# Patient Record
Sex: MALE | Employment: FULL TIME | URBAN - METROPOLITAN AREA
[De-identification: names, ages, dates, MRNs, and addresses within clinical notes are randomized per-mention and may not be internally consistent; named-entity substitution may affect disease eponyms.]

---

## 2025-06-05 ENCOUNTER — OFFICE VISIT (OUTPATIENT)
Dept: PRIMARY CARE | Facility: CLINIC | Age: 60
End: 2025-06-05
Payer: COMMERCIAL

## 2025-06-05 VITALS
RESPIRATION RATE: 16 BRPM | BODY MASS INDEX: 28.22 KG/M2 | SYSTOLIC BLOOD PRESSURE: 124 MMHG | WEIGHT: 186.2 LBS | HEIGHT: 68 IN | HEART RATE: 73 BPM | DIASTOLIC BLOOD PRESSURE: 85 MMHG | OXYGEN SATURATION: 96 %

## 2025-06-05 DIAGNOSIS — I21.11 STEMI INVOLVING RIGHT CORONARY ARTERY (MULTI): ICD-10-CM

## 2025-06-05 DIAGNOSIS — E78.5 DYSLIPIDEMIA, GOAL LDL BELOW 70: ICD-10-CM

## 2025-06-05 DIAGNOSIS — H53.8 BLURRY VISION: Primary | ICD-10-CM

## 2025-06-05 DIAGNOSIS — R06.09 DYSPNEA ON EXERTION: ICD-10-CM

## 2025-06-05 DIAGNOSIS — R07.9 CHEST PAIN, UNSPECIFIED TYPE: ICD-10-CM

## 2025-06-05 DIAGNOSIS — Z13.6 SCREENING FOR CARDIOVASCULAR CONDITION: ICD-10-CM

## 2025-06-05 PROBLEM — K21.9 GASTROESOPHAGEAL REFLUX DISEASE WITHOUT ESOPHAGITIS: Status: ACTIVE | Noted: 2021-07-07

## 2025-06-05 LAB — POC HEMOGLOBIN A1C: 5.4 % (ref 4.2–6.5)

## 2025-06-05 PROCEDURE — 1036F TOBACCO NON-USER: CPT | Performed by: NURSE PRACTITIONER

## 2025-06-05 PROCEDURE — 83036 HEMOGLOBIN GLYCOSYLATED A1C: CPT | Performed by: NURSE PRACTITIONER

## 2025-06-05 PROCEDURE — 99204 OFFICE O/P NEW MOD 45 MIN: CPT | Performed by: NURSE PRACTITIONER

## 2025-06-05 PROCEDURE — 93000 ELECTROCARDIOGRAM COMPLETE: CPT | Performed by: NURSE PRACTITIONER

## 2025-06-05 PROCEDURE — 3008F BODY MASS INDEX DOCD: CPT | Performed by: NURSE PRACTITIONER

## 2025-06-05 RX ORDER — ESCITALOPRAM OXALATE 10 MG/1
1 TABLET ORAL DAILY
COMMUNITY
End: 2025-06-05 | Stop reason: WASHOUT

## 2025-06-05 RX ORDER — ROSUVASTATIN CALCIUM 20 MG/1
20 TABLET, COATED ORAL
COMMUNITY
Start: 2023-11-27 | End: 2025-06-05 | Stop reason: SDUPTHER

## 2025-06-05 RX ORDER — ASPIRIN 81 MG/1
81 TABLET ORAL DAILY
COMMUNITY

## 2025-06-05 RX ORDER — ASPIRIN 81 MG/1
81 TABLET ORAL DAILY
COMMUNITY
End: 2025-06-05 | Stop reason: ENTERED-IN-ERROR

## 2025-06-05 RX ORDER — ROSUVASTATIN CALCIUM 20 MG/1
20 TABLET, COATED ORAL
Qty: 100 TABLET | Refills: 3 | Status: SHIPPED | OUTPATIENT
Start: 2025-06-05

## 2025-06-05 RX ORDER — CETIRIZINE HYDROCHLORIDE 10 MG/1
10 TABLET ORAL DAILY
COMMUNITY

## 2025-06-05 ASSESSMENT — ENCOUNTER SYMPTOMS
LOSS OF SENSATION IN FEET: 0
OCCASIONAL FEELINGS OF UNSTEADINESS: 0
DEPRESSION: 0

## 2025-06-05 ASSESSMENT — PATIENT HEALTH QUESTIONNAIRE - PHQ9
2. FEELING DOWN, DEPRESSED OR HOPELESS: NOT AT ALL
1. LITTLE INTEREST OR PLEASURE IN DOING THINGS: NOT AT ALL
SUM OF ALL RESPONSES TO PHQ9 QUESTIONS 1 AND 2: 0

## 2025-06-05 ASSESSMENT — COLUMBIA-SUICIDE SEVERITY RATING SCALE - C-SSRS
1. IN THE PAST MONTH, HAVE YOU WISHED YOU WERE DEAD OR WISHED YOU COULD GO TO SLEEP AND NOT WAKE UP?: NO
6. HAVE YOU EVER DONE ANYTHING, STARTED TO DO ANYTHING, OR PREPARED TO DO ANYTHING TO END YOUR LIFE?: NO
2. HAVE YOU ACTUALLY HAD ANY THOUGHTS OF KILLING YOURSELF?: NO

## 2025-06-05 NOTE — PROGRESS NOTES
"Subjective   Patient ID: Miquel Alatorre is a 60 y.o. male who presents for New Patient Visit (Had heart attack 4 years ago- cardiology referral- hasn't been feeling well ).    HPI     Past medical history: CAD s/p PCI  Current meds: ASA 81 mg daily, Zyrtec 10 mg daily, an OTC antacid  Past surgical history: L carotid tumor removal  Family medical history:   -Mother: living, enlarged heart, valve replacement, macular degeneration  -Father: , lung CA  -Maternal grandmother: , macular degeneration  -Maternal grandfather: , emphysema  -Paternal grandmother: , unknown  -Paternal grandfather: , unknown  -Brother: living, none  -Brother: living, none  -Daughter: living, asthma  -Daughter: living, none  -Son: living, asthma  -Son: living, none  Social history:   -Work: real estate  -Marital status:   -Children: 4  -Alcohol: none  -Tobacco: none  -Marijuana: none  -Illicit drugs: none    He reports over the last 6 months he has been noticing some concerning symptoms. Reports some shortness of breath with exertion, usually just steps.     Does also occasionally gets chest pain or heaviness. This occurs at least a few times per week. Gets associated palpitations, brain fog, and dizziness. Lasts a couple hours. Usually will just take some baby ASA.     Has also noticed some frequent blurry vision.     Was previously following with cardiology for previous CAD s/p PCI. Was on plavix but was stopped by cardiology after 18-24 months. States he was also previously on a cholesterol medication but states this was stopped, thinks cardiology stopped it but doesn't remember.     Review of Systems  ROS negative except as noted above in HPI.     Objective   /85 (BP Location: Left arm, Patient Position: Sitting, BP Cuff Size: Large adult)   Pulse 73   Resp 16   Ht 1.727 m (5' 7.99\")   Wt 84.5 kg (186 lb 3.2 oz)   SpO2 96%   BMI 28.32 kg/m²     Physical Exam  General: Alert and " oriented, in no acute distress. Appears stated age, well-nourished, and well hydrated  HEENT:  - Head: Normocephalic and atraumatic   - Eyes: EOMI, PERRLA  - ENT: Hearing grossly intact  Heart: RRR. No murmurs, clicks, or rubs  Lungs: Unlabored breathing. CTAB with no crackles, wheezes, or rhonchi  Abdomen: Normal BS in all 4 quadrants. Soft, non-tender, non-distended, with no masses  Extremities: Warm and well perfused. No edema. Normal peripheral pulses  Musculoskeletal: Normal gait and station  Neurological: Alert and oriented. No gross neurological deficits  Psychological: Appropriate mood and affect  Skin: No rash, abnormal lesions, cyanosis, or erythema    Assessment/Plan   Diagnoses and all orders for this visit:  Blurry vision  -     POCT glycosylated hemoglobin (Hb A1C) manually resulted: 5.4%  -     Recommend following up with eye doctor  Chest pain, unspecified type  Dyspnea on exertion  STEMI involving right coronary artery (Multi)  Dyslipidemia, goal LDL below 70  -     ECG 12 lead (Clinic Performed): NSR with no signs of acute ischemia   -     Echocardiogram stress test; Future  -     START rosuvastatin (Crestor) 20 mg tablet; Take 1 tablet (20 mg) by mouth once daily.  -     Lipid Panel; Future  -     Referral to Cardiology; Future  Screening for cardiovascular condition  -     Comprehensive metabolic panel; Future  -     Tsh With Reflex To Free T4 If Abnormal; Future  -     CBC and Auto Differential; Future    FU in the next couple weeks for physical and BP check; this is borderline in office today    NOLAN Guy-CNP  [facility]

## 2025-06-09 LAB
ALBUMIN SERPL-MCNC: 4.3 G/DL (ref 3.6–5.1)
ALP SERPL-CCNC: 84 U/L (ref 35–144)
ALT SERPL-CCNC: 13 U/L (ref 9–46)
ANION GAP SERPL CALCULATED.4IONS-SCNC: 8 MMOL/L (CALC) (ref 7–17)
AST SERPL-CCNC: 16 U/L (ref 10–35)
BASOPHILS # BLD AUTO: 70 CELLS/UL (ref 0–200)
BASOPHILS NFR BLD AUTO: 1.1 %
BILIRUB SERPL-MCNC: 0.9 MG/DL (ref 0.2–1.2)
BUN SERPL-MCNC: 11 MG/DL (ref 7–25)
CALCIUM SERPL-MCNC: 9.2 MG/DL (ref 8.6–10.3)
CHLORIDE SERPL-SCNC: 106 MMOL/L (ref 98–110)
CHOLEST SERPL-MCNC: 151 MG/DL
CHOLEST/HDLC SERPL: 3.1 (CALC)
CO2 SERPL-SCNC: 27 MMOL/L (ref 20–32)
CREAT SERPL-MCNC: 1.18 MG/DL (ref 0.7–1.3)
EGFRCR SERPLBLD CKD-EPI 2021: 72 ML/MIN/1.73M2
EOSINOPHIL # BLD AUTO: 410 CELLS/UL (ref 15–500)
EOSINOPHIL NFR BLD AUTO: 6.4 %
ERYTHROCYTE [DISTWIDTH] IN BLOOD BY AUTOMATED COUNT: 12.4 % (ref 11–15)
GLUCOSE SERPL-MCNC: 90 MG/DL (ref 65–99)
HCT VFR BLD AUTO: 45.8 % (ref 38.5–50)
HDLC SERPL-MCNC: 48 MG/DL
HGB BLD-MCNC: 15.3 G/DL (ref 13.2–17.1)
LDLC SERPL CALC-MCNC: 87 MG/DL (CALC)
LYMPHOCYTES # BLD AUTO: 1850 CELLS/UL (ref 850–3900)
LYMPHOCYTES NFR BLD AUTO: 28.9 %
MCH RBC QN AUTO: 31.7 PG (ref 27–33)
MCHC RBC AUTO-ENTMCNC: 33.4 G/DL (ref 32–36)
MCV RBC AUTO: 95 FL (ref 80–100)
MONOCYTES # BLD AUTO: 653 CELLS/UL (ref 200–950)
MONOCYTES NFR BLD AUTO: 10.2 %
NEUTROPHILS # BLD AUTO: 3418 CELLS/UL (ref 1500–7800)
NEUTROPHILS NFR BLD AUTO: 53.4 %
NONHDLC SERPL-MCNC: 103 MG/DL (CALC)
PLATELET # BLD AUTO: 287 THOUSAND/UL (ref 140–400)
PMV BLD REES-ECKER: 9.2 FL (ref 7.5–12.5)
POTASSIUM SERPL-SCNC: 4.2 MMOL/L (ref 3.5–5.3)
PROT SERPL-MCNC: 6.1 G/DL (ref 6.1–8.1)
RBC # BLD AUTO: 4.82 MILLION/UL (ref 4.2–5.8)
SODIUM SERPL-SCNC: 141 MMOL/L (ref 135–146)
TRIGL SERPL-MCNC: 74 MG/DL
TSH SERPL-ACNC: 0.85 MIU/L (ref 0.4–4.5)
WBC # BLD AUTO: 6.4 THOUSAND/UL (ref 3.8–10.8)

## 2025-06-16 ENCOUNTER — APPOINTMENT (OUTPATIENT)
Dept: PRIMARY CARE | Facility: CLINIC | Age: 60
End: 2025-06-16
Payer: COMMERCIAL

## 2025-06-16 VITALS
OXYGEN SATURATION: 98 % | HEIGHT: 68 IN | HEART RATE: 70 BPM | RESPIRATION RATE: 17 BRPM | WEIGHT: 186 LBS | BODY MASS INDEX: 28.19 KG/M2 | DIASTOLIC BLOOD PRESSURE: 86 MMHG | SYSTOLIC BLOOD PRESSURE: 122 MMHG

## 2025-06-16 DIAGNOSIS — Z23 NEED FOR SHINGLES VACCINE: ICD-10-CM

## 2025-06-16 DIAGNOSIS — Z00.00 ANNUAL PHYSICAL EXAM: Primary | ICD-10-CM

## 2025-06-16 DIAGNOSIS — Z12.83 SKIN CANCER SCREENING: ICD-10-CM

## 2025-06-16 DIAGNOSIS — Z12.5 PROSTATE CANCER SCREENING: ICD-10-CM

## 2025-06-16 DIAGNOSIS — Z23 NEED FOR TDAP VACCINATION: ICD-10-CM

## 2025-06-16 PROCEDURE — 1036F TOBACCO NON-USER: CPT | Performed by: NURSE PRACTITIONER

## 2025-06-16 PROCEDURE — 3008F BODY MASS INDEX DOCD: CPT | Performed by: NURSE PRACTITIONER

## 2025-06-16 PROCEDURE — 90715 TDAP VACCINE 7 YRS/> IM: CPT | Performed by: NURSE PRACTITIONER

## 2025-06-16 PROCEDURE — 99396 PREV VISIT EST AGE 40-64: CPT | Performed by: NURSE PRACTITIONER

## 2025-06-16 PROCEDURE — 90472 IMMUNIZATION ADMIN EACH ADD: CPT | Performed by: NURSE PRACTITIONER

## 2025-06-16 PROCEDURE — 90471 IMMUNIZATION ADMIN: CPT | Performed by: NURSE PRACTITIONER

## 2025-06-16 PROCEDURE — 90750 HZV VACC RECOMBINANT IM: CPT | Performed by: NURSE PRACTITIONER

## 2025-06-16 ASSESSMENT — PROMIS GLOBAL HEALTH SCALE
RATE_GENERAL_HEALTH: GOOD
RATE_SOCIAL_SATISFACTION: GOOD
RATE_AVERAGE_PAIN: 3
RATE_QUALITY_OF_LIFE: GOOD
CARRYOUT_PHYSICAL_ACTIVITIES: MOSTLY
RATE_PHYSICAL_HEALTH: GOOD
EMOTIONAL_PROBLEMS: SOMETIMES
RATE_MENTAL_HEALTH: GOOD
RATE_AVERAGE_FATIGUE: MODERATE
CARRYOUT_SOCIAL_ACTIVITIES: GOOD

## 2025-06-16 ASSESSMENT — PATIENT HEALTH QUESTIONNAIRE - PHQ9
SUM OF ALL RESPONSES TO PHQ9 QUESTIONS 1 AND 2: 0
1. LITTLE INTEREST OR PLEASURE IN DOING THINGS: NOT AT ALL
2. FEELING DOWN, DEPRESSED OR HOPELESS: NOT AT ALL

## 2025-06-16 ASSESSMENT — COLUMBIA-SUICIDE SEVERITY RATING SCALE - C-SSRS
2. HAVE YOU ACTUALLY HAD ANY THOUGHTS OF KILLING YOURSELF?: NO
1. IN THE PAST MONTH, HAVE YOU WISHED YOU WERE DEAD OR WISHED YOU COULD GO TO SLEEP AND NOT WAKE UP?: NO
6. HAVE YOU EVER DONE ANYTHING, STARTED TO DO ANYTHING, OR PREPARED TO DO ANYTHING TO END YOUR LIFE?: NO

## 2025-06-16 ASSESSMENT — ENCOUNTER SYMPTOMS
DEPRESSION: 0
LOSS OF SENSATION IN FEET: 0
OCCASIONAL FEELINGS OF UNSTEADINESS: 0

## 2025-06-16 NOTE — PROGRESS NOTES
Miquel Alatorre is a 60 y.o. male who is presenting for annual physical exam.     He presents today with no concerns.     Last  Visit: unknown  Reported Health: Good    Dental, Vision, Hearing:  Regular dental visits: no  - Brushes teeth 2 times per day  Vision problems: yes  - Wears glasses or contacts? yes, glasses  - Last eye exam: couple years ago  Hearing loss: no    Immunization status:  Up to date: no    Lifestyle:  Healthy diet: yes  Regular exercise: yes  - Exercise frequency: five days a week  - Type of exercise: walking   Alcohol: no  Tobacco: no  Drugs: no    Reproductive Health:  Sexually active: yes  Contraception: no  Erectile dysfunction: no    Colorectal Cancer Screening:  Last colonoscopy or Cologuard: never done, refused at this time    Prostate Cancer Screening:  Last PSA: never done, ordered today   Metabolic Screening:  Lipid profile: 6/9/2025  Glucose screen: 6/9/2025    Review of Systems  Gen: denies fever, chills, weight loss, fatigue  HEENT: denies sinus pressure, sinus congestion, runny nose, red eyes, itchy eyes, vision loss, ear pain, hearing loss, throat pain, trouble swallowing, reports intermittent blurry vision   Neck: denies neck pain, neck swelling or masses  CV: denies chest pain, palpitations, fast heart rate, syncope  Resp: denies shortness of breath, cough, wheezing  GI: denies abdominal pain, nausea, diarrhea, constipation, hematochezia, melena  : denies dysuria, hematuria, penile discharge, frequency  Endo: denies polydipsia, polyuria, heat/cold intolerance, weight change, hair thinning  Heme: denies easy bruising, easy bleeding  Neuro: denies headache, numbness, tingling, memory loss, changes in vision  MSK: denies joint pain, joint swelling, weakness  Psych: denies suicidal ideation, homicidal ideation, depression, anxiety, mood swings  Skin: denies rashes, abnormal lesions, itching, reports mole on face that he is concerned about      Previous History  Medical  "History[1]  Surgical History[2]  Social History[3]  Family History[4]  Allergies[5]  Current Outpatient Medications   Medication Instructions    aspirin 81 mg, Daily    cetirizine (ZYRTEC) 10 mg, Daily    rosuvastatin (CRESTOR) 20 mg, oral, Daily RT       There is no immunization history for the selected administration types on file for this patient.    Visit Vitals  /86 (BP Location: Left arm, Patient Position: Sitting, BP Cuff Size: Adult)   Pulse 70   Resp 17   Ht 1.727 m (5' 7.99\")   Wt 84.4 kg (186 lb)   SpO2 98%   BMI 28.29 kg/m²   Smoking Status Never   BSA 2.01 m²        Physical Exam:  General: Alert and oriented, in no acute distress. Appears stated age, well-nourished, and well hydrated  HEENT:  - Head: Normocephalic and atraumatic   - Eyes: EOMI, PERRLA  - ENT: Hearing grossly intact. Mucus membranes pink and moist without lesions. Tonsils present without swelling or exudates. Good dentition. TMs gray  Neck: Supple. No stiffness. No thyromegaly or thyroid nodules  Heart: RRR. No murmurs, clicks, or rubs  Lungs: Unlabored breathing. CTAB with no crackles, wheezes, or rhonchi  Abdomen: Normal BS in all 4 quadrants. Soft, non-tender, non-distended, with no masses  Extremities: Warm and well perfused. No edema. Normal peripheral pulses  Musculoskeletal: ROM intact. Strength 5/5 in BUE and BLE. No joint swelling. Normal gait and station  Neurological: Alert and oriented. No gross neurological deficits. Normal sensation. No weakness. DTRs +2/4   Psychological: Appropriate mood and affect  Skin: No rash, abnormal lesions, cyanosis, or erythema      Assessment and Plan     Miquel was seen today for annual exam.  Diagnoses and all orders for this visit:  Annual physical exam (Primary)  - patient refused colon cancer screening today   - reports intermittent blurry vision, prior workup negative, will follow up with ophthalmologist  Prostate cancer screening  - Prostate Spec.Ag,Screen; Future  Need for Tdap " vaccination  - Tdap vaccine, age 7 years and older  (BOOSTRIX)  Need for shingles vaccine  - Zoster vaccine, recombinant, adult (SHINGRIX)  Skin cancer screening  - Referral to Dermatology      Patient will RTC for nurse visit for second shingles vaccine and first pneumonia vaccine. He will RTC in one year for physical.    Bertha Rangel RN   [facility] FNP Student             [1]   Past Medical History:  Diagnosis Date    ADHD (attention deficit hyperactivity disorder)     Anxiety     Headache     Heart attack 07/2020    Heart disease    [2]   Past Surgical History:  Procedure Laterality Date    CARDIAC SURGERY  2021   [3]   Social History  Tobacco Use    Smoking status: Never    Smokeless tobacco: Never   Vaping Use    Vaping status: Never Used   Substance Use Topics    Alcohol use: Never    Drug use: Never   [4]   Family History  Problem Relation Name Age of Onset    Macular degeneration Mother      Valvular heart disease Mother      Lung cancer Father      No Known Problems Brother      No Known Problems Brother      Asthma Daughter Iz     No Known Problems Daughter      Asthma Son M     No Known Problems Son      Macular degeneration Mother's Brother      Macular degeneration Maternal Grandmother      Emphysema Maternal Grandfather     [5] No Known Allergies

## 2025-06-16 NOTE — PROGRESS NOTES
I was present with the APRN student who participated in the documentation of this note. I have personally seen and re-examined the patient and performed the medical decision-making components (assessment and plan of care). I have reviewed the APRN student documentation and verified the findings in the note as written with additions or exceptions as stated in the body of this note.    Marietta Smith, APRN-CNP

## 2025-06-18 ENCOUNTER — HOSPITAL ENCOUNTER (OUTPATIENT)
Dept: CARDIOLOGY | Facility: CLINIC | Age: 60
Discharge: HOME | End: 2025-06-18
Payer: COMMERCIAL

## 2025-06-18 DIAGNOSIS — R07.9 CHEST PAIN, UNSPECIFIED TYPE: ICD-10-CM

## 2025-06-18 DIAGNOSIS — R06.09 DYSPNEA ON EXERTION: ICD-10-CM

## 2025-06-18 PROCEDURE — 93350 STRESS TTE ONLY: CPT

## 2025-06-18 PROCEDURE — 93350 STRESS TTE ONLY: CPT | Performed by: INTERNAL MEDICINE

## 2025-06-18 PROCEDURE — 93016 CV STRESS TEST SUPVJ ONLY: CPT | Performed by: INTERNAL MEDICINE

## 2025-06-18 PROCEDURE — 93018 CV STRESS TEST I&R ONLY: CPT | Performed by: INTERNAL MEDICINE

## 2025-07-10 ENCOUNTER — OFFICE VISIT (OUTPATIENT)
Dept: CARDIOLOGY | Facility: CLINIC | Age: 60
End: 2025-07-10
Payer: COMMERCIAL

## 2025-07-10 VITALS
OXYGEN SATURATION: 97 % | DIASTOLIC BLOOD PRESSURE: 86 MMHG | WEIGHT: 193 LBS | SYSTOLIC BLOOD PRESSURE: 114 MMHG | HEIGHT: 68 IN | HEART RATE: 75 BPM | BODY MASS INDEX: 29.25 KG/M2

## 2025-07-10 DIAGNOSIS — R07.9 EXERTIONAL CHEST PAIN: ICD-10-CM

## 2025-07-10 DIAGNOSIS — I21.11 STEMI INVOLVING RIGHT CORONARY ARTERY (MULTI): ICD-10-CM

## 2025-07-10 DIAGNOSIS — I25.10 ASHD (ARTERIOSCLEROTIC HEART DISEASE): Primary | ICD-10-CM

## 2025-07-10 DIAGNOSIS — E78.5 DYSLIPIDEMIA, GOAL LDL BELOW 70: ICD-10-CM

## 2025-07-10 DIAGNOSIS — R06.09 EXERTIONAL DYSPNEA: ICD-10-CM

## 2025-07-10 PROCEDURE — 3008F BODY MASS INDEX DOCD: CPT | Performed by: STUDENT IN AN ORGANIZED HEALTH CARE EDUCATION/TRAINING PROGRAM

## 2025-07-10 PROCEDURE — 99204 OFFICE O/P NEW MOD 45 MIN: CPT | Performed by: STUDENT IN AN ORGANIZED HEALTH CARE EDUCATION/TRAINING PROGRAM

## 2025-07-10 PROCEDURE — 99212 OFFICE O/P EST SF 10 MIN: CPT

## 2025-07-10 RX ORDER — ROSUVASTATIN CALCIUM 40 MG/1
40 TABLET, COATED ORAL
Qty: 90 TABLET | Refills: 1 | Status: SHIPPED | OUTPATIENT
Start: 2025-07-10 | End: 2026-01-06

## 2025-07-10 RX ORDER — AMLODIPINE BESYLATE 2.5 MG/1
2.5 TABLET ORAL DAILY
Qty: 90 TABLET | Refills: 1 | Status: SHIPPED | OUTPATIENT
Start: 2025-07-10 | End: 2026-01-06

## 2025-07-10 ASSESSMENT — ENCOUNTER SYMPTOMS
DEPRESSION: 0
OCCASIONAL FEELINGS OF UNSTEADINESS: 0
LOSS OF SENSATION IN FEET: 0

## 2025-07-10 NOTE — PATIENT INSTRUCTIONS
Thank you for your visit today. Please contact our office (via MyChart or phone) with any additional questions.     [facility]    Yolanda, RN/Clinic Nurse for:    Dr. Rashaad Celeste    [address] [facility]  [place] [address]    Phone: [phone] Press Option 5 then Option 3 to speak with the Clinic Nurse (Yolanda)    _____    To Reach:    Billing Questions -    [phone]  Scheduling / Rescheduling -  Option 1  Refills / Medication Requests -  Option 3  General Office /  -  Option 4  Results -     Option 6  Medical Records -    Option 7  Repeat Options -    Option 9

## 2025-07-10 NOTE — PROGRESS NOTES
Cardiology New Patient History and Physical    Reason for referral: Exertional chest pain, dyspnea; hx of CAD    HPI: Miquel Alatorre is a 60 y.o.  male who presents today for exertional chest pain. Past medical history of inferior STEMI (7/7/2021- CRISTOFER to RCA 2.75 x 22 mm [employer] CRISTOFER), dyslipidemia .      Past Medical History:   He has a past medical history of ADHD (attention deficit hyperactivity disorder), Anxiety, Headache, Heart attack (07/2020), and Heart disease.    Surgical History:   He has a past surgical history that includes Cardiac surgery (2021).    Family History:   Family History[1]    Allergies:  Patient has no known allergies.     Social History:   - Non-smoker  - Employed: real estate     Prior Cardiovascular Testing (personally reviewed):     Exercise stress echo (6/19/2025)  1. The resting ejection fraction was estimated at 50 to 55% with a peak exercise ejection fraction estimated at 65 to 70%.   2. Negative stress EKG for ischemia.   3. Average functional capacity for age.   4. Exercise associated chest discomfort rated at 3 out of 10. Possibly consistent with angina.   5. Normal resting and post-rest echocardiographic imaging was seen.   6. This is a intermediate probability stress test. EKG and echo imaging was normal. However the patient developed an atypical discomfort in the chest. Clinical correlation is required.    Coronary angiography (7/2021)  LMT: The LMT has mild diffuse disease.   Additional Comment: Large, long segment vessel that bifurcates. Mild luminal   irregularities present.   LAD:  mild diffuse disease.   LCX:  mild luminal irregularities.   RCA: The mid RCA is narrowed 100 % - thrombus and focal disease. Additional Comment: Large dominant vessel that is occluded at the transition from the proximal to mid segment      Review of Systems:  ROS    Objective     Outpatient Medications:  Current Medications[2]     Last Recorded Vitals  /86 (BP Location:  "Left arm, Patient Position: Sitting)   Pulse 75   Ht 1.727 m (5' 8\")   Wt 87.5 kg (193 lb)   SpO2 97%   BMI 29.35 kg/m²     Physical Exam:  Physical Exam    Lab Review:    Lab Results   Component Value Date    GLUCOSE 90 06/09/2025    CALCIUM 9.2 06/09/2025     06/09/2025    K 4.2 06/09/2025    CO2 27 06/09/2025     06/09/2025    BUN 11 06/09/2025    CREATININE 1.18 06/09/2025       Lab Results   Component Value Date    WBC 6.4 06/09/2025    HGB 15.3 06/09/2025    HCT 45.8 06/09/2025    MCV 95.0 06/09/2025     06/09/2025       Lab Results   Component Value Date    CHOL 151 06/09/2025     Lab Results   Component Value Date    HDL 48 06/09/2025     Lab Results   Component Value Date    LDLCALC 87 06/09/2025     Lab Results   Component Value Date    TRIG 74 06/09/2025     Lab Results   Component Value Date    TSH 0.85 06/09/2025       Assessment:       Overall Plan:      Disposition: Return to cardiology clinic in *** months    Miguel Neil MD             [1]   Family History  Problem Relation Name Age of Onset    Macular degeneration Mother      Valvular heart disease Mother      Lung cancer Father      No Known Problems Brother      No Known Problems Brother      Asthma Daughter Iz     No Known Problems Daughter      Asthma Son M     No Known Problems Son      Macular degeneration Mother's Brother      Macular degeneration Maternal Grandmother      Emphysema Maternal Grandfather     [2]   Current Outpatient Medications:     aspirin 81 mg EC tablet, Take 1 tablet (81 mg) by mouth once daily., Disp: , Rfl:     cetirizine (ZyrTEC) 10 mg tablet, Take 1 tablet (10 mg) by mouth once daily., Disp: , Rfl:     rosuvastatin (Crestor) 20 mg tablet, Take 1 tablet (20 mg) by mouth once daily., Disp: 100 tablet, Rfl: 3    " Pulmonary effort is normal. No respiratory distress.      Breath sounds: Normal breath sounds.   Abdominal:      General: Bowel sounds are normal. There is no distension.      Palpations: Abdomen is soft.     Musculoskeletal:         General: No swelling.     Skin:     General: Skin is warm and dry.     Neurological:      General: No focal deficit present.      Mental Status: He is alert.      Cranial Nerves: No cranial nerve deficit.      Motor: No weakness.     Psychiatric:         Mood and Affect: Mood normal.         Behavior: Behavior normal.         Lab Review:    Lab Results   Component Value Date    GLUCOSE 90 06/09/2025    CALCIUM 9.2 06/09/2025     06/09/2025    K 4.2 06/09/2025    CO2 27 06/09/2025     06/09/2025    BUN 11 06/09/2025    CREATININE 1.18 06/09/2025       Lab Results   Component Value Date    WBC 6.4 06/09/2025    HGB 15.3 06/09/2025    HCT 45.8 06/09/2025    MCV 95.0 06/09/2025     06/09/2025       Lab Results   Component Value Date    CHOL 151 06/09/2025     Lab Results   Component Value Date    HDL 48 06/09/2025     Lab Results   Component Value Date    LDLCALC 87 06/09/2025     Lab Results   Component Value Date    TRIG 74 06/09/2025     Lab Results   Component Value Date    TSH 0.85 06/09/2025       Assessment:   60 y.o.  male who presents today for exertional chest pain. Past medical history of inferior STEMI (7/7/2021- CRISTOFER to [facility]CA [facility] x [facility]), dyslipidemia .    Patient with a 3-month history of intermittent exertional chest pain and dyspnea.  Given prior atherosclerotic heart disease patient underwent further evaluation with exercise stress echo.  Exercise stress echo was an adequate stress test; low risk for ischemia.  We will initiate amlodipine 2.5 mg daily and uptitrating as tolerated; patient agreeable.  If chest pains persisted despite max tolerated antianginal therapy would then consider diagnostic angiography.    Overall  "Plan:  1.  Exertional chest pain dyspnea  - Exercise stress echo was low risk for ischemia  - Add amlodipine 2.5 mg daily and uptitrate as tolerated  - Continue aspirin 81 mg daily, statin therapy  - If exertional chest pain and dyspnea persist would then consider diagnostic coronary angiography/left heart catheterization    2.  Atherosclerotic heart disease  - Complicated by inferior STEMI July 2021  - Continue aspirin 81 mg daily  - Add amlodipine 2.5 mg daily  - Encouraged heart healthy/Mediterranean style diet  - Encouraged regular physical activity    3.  Dyslipidemia  - Goal LDL less than 70 mg/dL; not at goal  - Continue rosuvastatin 40 mg daily  - Encouraged dietary and lifestyle modifications  - If dyslipidemia remains suboptimal controlled would then add ezetimibe 10 mg daily    4. Discussed \"red flag\" symptoms that should prompt immediate medical attention; patient verbalized understanding    Disposition: Return to cardiology clinic in 3 months    Miguel Neil MD             [1]   Family History  Problem Relation Name Age of Onset    Macular degeneration Mother      Valvular heart disease Mother      Lung cancer Father      No Known Problems Brother      No Known Problems Brother      Asthma Daughter Iz     No Known Problems Daughter      Asthma Son M     No Known Problems Son      Macular degeneration Mother's Brother      Macular degeneration Maternal Grandmother      Emphysema Maternal Grandfather     [2]   Current Outpatient Medications:     aspirin 81 mg EC tablet, Take 1 tablet (81 mg) by mouth once daily., Disp: , Rfl:     cetirizine (ZyrTEC) 10 mg tablet, Take 1 tablet (10 mg) by mouth once daily., Disp: , Rfl:     rosuvastatin (Crestor) 20 mg tablet, Take 1 tablet (20 mg) by mouth once daily., Disp: 100 tablet, Rfl: 3    "

## 2025-07-25 ASSESSMENT — ENCOUNTER SYMPTOMS
PSYCHIATRIC NEGATIVE: 1
EYES NEGATIVE: 1
PALPITATIONS: 0
ORTHOPNEA: 0
ENDOCRINE NEGATIVE: 1
SYNCOPE: 0
MUSCULOSKELETAL NEGATIVE: 1
ALLERGIC/IMMUNOLOGIC NEGATIVE: 1
NEUROLOGICAL NEGATIVE: 1
PND: 0
CONSTITUTIONAL NEGATIVE: 1
NEAR-SYNCOPE: 0
RESPIRATORY NEGATIVE: 1
HEMATOLOGIC/LYMPHATIC NEGATIVE: 1
DYSPNEA ON EXERTION: 0
GASTROINTESTINAL NEGATIVE: 1

## 2025-08-08 LAB — PSA SERPL-MCNC: 0.31 NG/ML

## 2025-09-04 DIAGNOSIS — R07.9 EXERTIONAL CHEST PAIN: ICD-10-CM

## 2025-09-04 DIAGNOSIS — Z01.812 ENCOUNTER FOR PRE-OPERATIVE LABORATORY TESTING: ICD-10-CM

## 2025-09-04 DIAGNOSIS — R06.09 EXERTIONAL DYSPNEA: ICD-10-CM

## 2025-09-04 DIAGNOSIS — I25.10 ASHD (ARTERIOSCLEROTIC HEART DISEASE): ICD-10-CM

## 2025-10-20 ENCOUNTER — APPOINTMENT (OUTPATIENT)
Dept: PRIMARY CARE | Facility: CLINIC | Age: 60
End: 2025-10-20
Payer: COMMERCIAL

## 2026-06-22 ENCOUNTER — APPOINTMENT (OUTPATIENT)
Dept: PRIMARY CARE | Facility: CLINIC | Age: 61
End: 2026-06-22
Payer: COMMERCIAL